# Patient Record
Sex: MALE | Race: WHITE | NOT HISPANIC OR LATINO | Employment: FULL TIME | URBAN - NONMETROPOLITAN AREA
[De-identification: names, ages, dates, MRNs, and addresses within clinical notes are randomized per-mention and may not be internally consistent; named-entity substitution may affect disease eponyms.]

---

## 2022-10-20 ENCOUNTER — OFFICE VISIT (OUTPATIENT)
Dept: INTERNAL MEDICINE | Facility: CLINIC | Age: 42
End: 2022-10-20

## 2022-10-20 VITALS
HEIGHT: 74 IN | WEIGHT: 234 LBS | OXYGEN SATURATION: 97 % | TEMPERATURE: 98.2 F | SYSTOLIC BLOOD PRESSURE: 148 MMHG | DIASTOLIC BLOOD PRESSURE: 110 MMHG | HEART RATE: 77 BPM | BODY MASS INDEX: 30.03 KG/M2

## 2022-10-20 DIAGNOSIS — E66.9 OBESITY (BMI 30-39.9): ICD-10-CM

## 2022-10-20 DIAGNOSIS — Z13.228 SCREENING FOR ENDOCRINE, METABOLIC AND IMMUNITY DISORDER: ICD-10-CM

## 2022-10-20 DIAGNOSIS — Z76.89 ENCOUNTER TO ESTABLISH CARE: ICD-10-CM

## 2022-10-20 DIAGNOSIS — R59.0 INGUINAL ADENOPATHY: ICD-10-CM

## 2022-10-20 DIAGNOSIS — Z13.220 SCREENING FOR CHOLESTEROL LEVEL: ICD-10-CM

## 2022-10-20 DIAGNOSIS — Z11.59 ENCOUNTER FOR HEPATITIS C SCREENING TEST FOR LOW RISK PATIENT: ICD-10-CM

## 2022-10-20 DIAGNOSIS — Z13.0 SCREENING FOR DEFICIENCY ANEMIA: ICD-10-CM

## 2022-10-20 DIAGNOSIS — Z13.29 SCREENING FOR ENDOCRINE, METABOLIC AND IMMUNITY DISORDER: ICD-10-CM

## 2022-10-20 DIAGNOSIS — Z00.00 ANNUAL PHYSICAL EXAM: Primary | ICD-10-CM

## 2022-10-20 DIAGNOSIS — Z13.1 SCREENING FOR DIABETES MELLITUS (DM): ICD-10-CM

## 2022-10-20 DIAGNOSIS — Z13.29 SCREENING FOR THYROID DISORDER: ICD-10-CM

## 2022-10-20 DIAGNOSIS — R53.83 OTHER FATIGUE: ICD-10-CM

## 2022-10-20 DIAGNOSIS — Z13.0 SCREENING FOR ENDOCRINE, METABOLIC AND IMMUNITY DISORDER: ICD-10-CM
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
## 2022-10-20 DIAGNOSIS — R03.0 ELEVATED BLOOD PRESSURE READING IN OFFICE WITHOUT DIAGNOSIS OF HYPERTENSION: ICD-10-CM

## 2022-10-20 PROCEDURE — 99386 PREV VISIT NEW AGE 40-64: CPT | Performed by: NURSE PRACTITIONER

## 2022-10-20 RX ORDER — DOXYCYCLINE HYCLATE 100 MG/1
100 CAPSULE ORAL 2 TIMES DAILY
Qty: 14 CAPSULE | Refills: 0 | Status: SHIPPED | OUTPATIENT
Start: 2022-10-20 | End: 2022-10-27

## 2022-10-20 NOTE — PROGRESS NOTES
Subjective   Rhett Sweeney is a 41 y.o. male and is here for a comprehensive physical exam and to establish care.   Acutely concerned about left groin lymph node.   Bit by tick around that area on left upper leg, chest, and back. Lymph node became palpable about 3 weeks ago and felt feverish.  Size of lymph node has decreased.  Been applying mupirocin to tick bite on left leg.   Denies fever, aches, chills. Admits to feeling overall tired and exhausted since.   No significant medical history.  BP is elevated - denies headache, visual changes, dizziness, lightheadedness, chest pain, SOA.     HPI:    Health Habits:  Eye exam within last 2 years? No   Dental exam every 6 months? No   Exercise habits: Working - no exercise outside of work-   Healthy diet? Typical american diet    The 10-year ASCVD risk score (Song BENJAMIN, et al., 2019) is: 1.1%    Values used to calculate the score:      Age: 41 years      Sex: Male      Is Non- : No      Diabetic: No      Tobacco smoker: No      Systolic Blood Pressure: 148 mmHg      Is BP treated: No      HDL Cholesterol: 52 mg/dL      Total Cholesterol: 177 mg/dL    Do you take any herbs or supplements that were not prescribed by a doctor? no  Are you taking aspirin daily? no     History:  Date last PSA: Never  He reports No decrease in urinary stream,  No nocturia, No dribbling, No hesitancy.    Family history of prostate cancer: No     The following portions of the patient's history were reviewed and updated as appropriate: He  has no past medical history on file.  He does not have a problem list on file.  He  has a past surgical history that includes Elbow surgery and Knee surgery.  His family history includes Arthritis in his maternal grandmother; Heart disease in his maternal grandfather.  He  reports that he has never smoked. He has never used smokeless tobacco. He reports current alcohol use. He reports that he does not use drugs.  Current Outpatient  "Medications   Medication Sig Dispense Refill   • doxycycline (VIBRAMYCIN) 100 MG capsule Take 1 capsule by mouth 2 (Two) Times a Day for 7 days. 14 capsule 0     No current facility-administered medications for this visit.       Review of Systems    Review of Systems   Constitutional: Positive for fatigue. Negative for chills and fever.   Respiratory: Negative.    Cardiovascular: Negative.    Gastrointestinal: Negative.    Musculoskeletal: Negative.    Skin: Positive for skin lesions.   Hematological: Positive for adenopathy.   All other systems reviewed and are negative.      Objective   BP (!) 148/110   Pulse 77   Temp 98.2 °F (36.8 °C) (Temporal)   Ht 188 cm (74\")   Wt 106 kg (234 lb)   SpO2 97%   BMI 30.04 kg/m²     Physical Exam  Constitutional:       General: He is not in acute distress.     Appearance: Normal appearance. He is obese. He is not diaphoretic.   HENT:      Head: Normocephalic and atraumatic.      Right Ear: Tympanic membrane, ear canal and external ear normal.      Left Ear: Tympanic membrane, ear canal and external ear normal.      Nose: Nose normal.      Mouth/Throat:      Mouth: Mucous membranes are moist.   Eyes:      Extraocular Movements: Extraocular movements intact.   Cardiovascular:      Rate and Rhythm: Normal rate and regular rhythm.   Pulmonary:      Effort: Pulmonary effort is normal.      Breath sounds: Normal breath sounds.   Musculoskeletal:         General: Normal range of motion.      Cervical back: Normal range of motion.   Lymphadenopathy:      Lower Body: Left inguinal adenopathy (nontender, mobile) present.   Skin:     Comments: Healed lesion to upper left thigh and back (where pt states he had tick bites), mild erythema, no drainage, firmness or warmth    Neurological:      General: No focal deficit present.      Mental Status: He is alert and oriented to person, place, and time.   Psychiatric:         Mood and Affect: Mood normal.         Behavior: Behavior normal. "         Health Maintenance   Topic Date Due   • INFLUENZA VACCINE  03/31/2023 (Originally 8/1/2022)   • TDAP/TD VACCINES (2 - Tdap) 08/09/2028        Assessment & Plan   Healthy male exam.     1.    Diagnosis Plan   1. Annual physical exam  CBC (No Diff)    Comprehensive Metabolic Panel    Lipid Panel    TSH Rfx On Abnormal To Free T4    Hemoglobin A1c    Hepatitis C Antibody      2. Encounter to establish care  CBC (No Diff)    Comprehensive Metabolic Panel    Lipid Panel    TSH Rfx On Abnormal To Free T4    Hemoglobin A1c    Hepatitis C Antibody      3. Elevated blood pressure reading in office without diagnosis of hypertension  Blood pressure is elevated today.  Recommend DASH diet, less than 1500 mg of sodium daily, 150 minutes of exercise per week, and home blood pressure monitoring.  Keep a log of your blood pressures and bring with you into the next appointment along with your home blood pressure machine.  When you check your blood pressure, make sure you have been sitting for 15 to 20 minutes, feet flat on the floor, and arm at the level of your heart.  Blood pressure goal is less than 130/80.  If blood pressure is over 180/110, that is dangerous and would require an ER visit.      4. Inguinal adenopathy; left  CBC (No Diff)    [facility] (IgG / M)    Lyme Disease Total Antibody With Reflex to Immunoassay    Ehrlichia Antibody Panel  Treat prophylactically with doxycycline due to multiple tick bites, adenopathy. Labs to rule out tickborne illnesses.       5. Tick bite of multiple sites  [facility] (IgG / M)    Lyme Disease Total Antibody With Reflex to Immunoassay    Ehrlichia Antibody Panel  Treat prophylactically with doxycycline due to multiple tick bites, adenopathy. Labs to rule out tickborne illnesses.       6. Other fatigue  Testosterone      7. Screening for thyroid disorder  TSH Rfx On Abnormal To Free T4      8. Screening for diabetes mellitus (DM)  Comprehensive Metabolic Panel     Hemoglobin A1c      9. Screening for deficiency anemia  CBC (No Diff)      10. Screening for cholesterol level  Lipid Panel      11. Screening for endocrine, metabolic and immunity disorder  Comprehensive Metabolic Panel      12. Encounter for hepatitis C screening test for low risk patient  Hepatitis C Antibody      13. Obesity (BMI 30-39.9)  BMI is >= 30 and <35. (Class 1 Obesity). The following options were offered after discussion;: exercise counseling/recommendations and nutrition counseling/recommendations          2. Patient Counseling:  -Health maintenance information provided and discussed  -Counseled on age-appropriate health screenings  -Immunizations for age discussed, encouraged.  -Encouraged 30 minutes of exercise 5 days a week, or 150 minutes total per week.  -Recommend healthy diet choices and portion control   -Discussed importance of regular dental visits and cleanings every 6 months.  -Discussed importance of regular eye exams    3. Discussed the patient's BMI with him.  The BMI is above average; BMI management plan is completed  4. Return in about 2 weeks (around 11/3/2022) for BP.         Prema Ramirez, APRN  10/20/2022  14:57 EDT

## 2022-10-21 ENCOUNTER — PATIENT MESSAGE (OUTPATIENT)
Dept: INTERNAL MEDICINE | Facility: CLINIC | Age: 42
End: 2022-10-21

## 2022-10-24 LAB
A PHAGOCYTOPH IGG TITR SER IF: NEGATIVE {TITER}
A PHAGOCYTOPH IGM TITR SER IF: NEGATIVE {TITER}
ALBUMIN SERPL-MCNC: 4.6 G/DL (ref 3.5–5.2)
ALBUMIN/GLOB SERPL: 1.6 G/DL
ALP SERPL-CCNC: 64 U/L (ref 39–117)
ALT SERPL-CCNC: 23 U/L (ref 1–41)
AST SERPL-CCNC: 21 U/L (ref 1–40)
B BURGDOR IGG+IGM SER QL IA: NEGATIVE
BILIRUB SERPL-MCNC: 0.5 MG/DL (ref 0–1.2)
BUN SERPL-MCNC: 8 MG/DL (ref 6–20)
BUN/CREAT SERPL: 10.4 (ref 7–25)
CALCIUM SERPL-MCNC: 9.6 MG/DL (ref 8.6–10.5)
CHLORIDE SERPL-SCNC: 104 MMOL/L (ref 98–107)
CHOLEST SERPL-MCNC: 177 MG/DL (ref 0–200)
CO2 SERPL-SCNC: 26 MMOL/L (ref 22–29)
CREAT SERPL-MCNC: 0.77 MG/DL (ref 0.76–1.27)
E CHAFFEENSIS IGG TITR SER IF: NEGATIVE {TITER}
E CHAFFEENSIS IGM TITR SER IF: NEGATIVE {TITER}
EGFRCR SERPLBLD CKD-EPI 2021: 116.1 ML/MIN/1.73
ERYTHROCYTE [DISTWIDTH] IN BLOOD BY AUTOMATED COUNT: 12 % (ref 12.3–15.4)
GLOBULIN SER CALC-MCNC: 2.9 GM/DL
GLUCOSE SERPL-MCNC: 79 MG/DL (ref 65–99)
HBA1C MFR BLD: 5.1 % (ref 4.8–5.6)
HCT VFR BLD AUTO: 43.2 % (ref 37.5–51)
HCV AB S/CO SERPL IA: <0.1 S/CO RATIO (ref 0–0.9)
HDLC SERPL-MCNC: 52 MG/DL (ref 40–60)
HGB BLD-MCNC: 15.5 G/DL (ref 13–17.7)
LDLC SERPL CALC-MCNC: 109 MG/DL (ref 0–100)
MCH RBC QN AUTO: 32.2 PG (ref 26.6–33)
MCHC RBC AUTO-ENTMCNC: 35.9 G/DL (ref 31.5–35.7)
MCV RBC AUTO: 89.6 FL (ref 79–97)
PLATELET # BLD AUTO: 271 10*3/MM3 (ref 140–450)
POTASSIUM SERPL-SCNC: 4.4 MMOL/L (ref 3.5–5.2)
PROT SERPL-MCNC: 7.5 G/DL (ref 6–8.5)
R RICKETTSI IGG SER QL IA: POSITIVE
R RICKETTSI IGG TITR SER IF: ABNORMAL {TITER}
R RICKETTSI IGM SER-ACNC: 0.54 INDEX (ref 0–0.89)
RBC # BLD AUTO: 4.82 10*6/MM3 (ref 4.14–5.8)
SODIUM SERPL-SCNC: 141 MMOL/L (ref 136–145)
TESTOST SERPL-MCNC: 493 NG/DL (ref 264–916)
TRIGL SERPL-MCNC: 87 MG/DL (ref 0–150)
TSH SERPL DL<=0.005 MIU/L-ACNC: 2.07 UIU/ML (ref 0.27–4.2)
VLDLC SERPL CALC-MCNC: 16 MG/DL (ref 5–40)
WBC # BLD AUTO: 7.28 10*3/MM3 (ref 3.4–10.8)

## 2022-10-24 NOTE — PROGRESS NOTES
Notify pt of normal results  - LDL slightly elevated at 109  -Pending RMSF labs, other tick panels negative

## 2022-10-25 NOTE — PROGRESS NOTES
Call and notify pt he did test positive for [place] mountain spotted fever which is a tick borne illness. The antibiotic I sent for him will treat this condition. This is likely why he had the lymph node swollen around the bite sight and feeling fatigued  Complete the entire course of antibiotics without missing dose.   Also have patient keep a log of his Bps and schedule appointment to come in to recheck asap as it was elevated significantly in office.

## 2022-11-07 ENCOUNTER — OFFICE VISIT (OUTPATIENT)
Dept: INTERNAL MEDICINE | Facility: CLINIC | Age: 42
End: 2022-11-07

## 2022-11-07 VITALS
TEMPERATURE: 97.8 F | BODY MASS INDEX: 30.29 KG/M2 | DIASTOLIC BLOOD PRESSURE: 90 MMHG | HEART RATE: 61 BPM | WEIGHT: 236 LBS | HEIGHT: 74 IN | SYSTOLIC BLOOD PRESSURE: 140 MMHG | OXYGEN SATURATION: 98 %

## 2022-11-07 DIAGNOSIS — I10 PRIMARY HYPERTENSION: Primary | ICD-10-CM

## 2022-11-07 PROCEDURE — 99213 OFFICE O/P EST LOW 20 MIN: CPT | Performed by: NURSE PRACTITIONER

## 2022-11-07 NOTE — PROGRESS NOTES
"  Office Visit      Patient Name: Rhett Sweeney  : 1980   MRN: [id number]   Care Team: Patient Care Team:  Prema Ramirez APRN as PCP - General (Family Medicine)    Chief Complaint  Hypertension (F/up)    Subjective     Subjective      Rhett Sweeney presents to [facility] for follow-up of elevated blood pressure reading on initial visit.  He was suppose to have purchased BP machine and monitored, he has not done so.   Initial blood pressure 142/90, repeat 140/90.  Discussed with patient he has had 2 elevated readings in office and this is eligible for for diagnosis of hypertension.  Patient declines medication at this time.  States that his diet has been very bad lately, he has been eating a lot of fast food and high salt foods.  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  He denies chest pain, shortness of breath, headache, dizziness, and excess fatigue.   He is actually feeling much better after treatment for [place] spotted fever with doxycycline.    Review of Systems-see HPI    Objective     Objective   Vital Signs:   /90   Pulse 61   Temp 97.8 °F (36.6 °C) (Temporal)   Ht 188 cm (74\")   Wt 107 kg (236 lb)   SpO2 98%   BMI 30.30 kg/m²     Physical Exam  Constitutional:       General: He is not in acute distress.     Appearance: Normal appearance. He is not diaphoretic.   HENT:      Right Ear: External ear normal.      Left Ear: External ear normal.      Nose: Nose normal.      Mouth/Throat:      Mouth: Mucous membranes are moist.      Pharynx: Oropharynx is clear.   Eyes:      Extraocular Movements: Extraocular movements intact.   Cardiovascular:      Rate and Rhythm: Normal rate and regular rhythm.   Pulmonary:      Effort: Pulmonary effort is normal.      Breath sounds: Normal breath sounds.   Musculoskeletal:         General: Normal range of motion.      Cervical back: Normal range of motion.      Right lower leg: No edema.      Left lower leg: No edema. "   Neurological:      Mental Status: He is alert and oriented to person, place, and time.   Psychiatric:         Mood and Affect: Mood normal.         Behavior: Behavior normal.          Assessment / Plan      Assessment & Plan   Problem List Items Addressed This Visit    None  Visit Diagnoses     Primary hypertension    -  Primary        BP remains elevated. Discussed initiating medication, patient declines. Recommend DASH diet, less than 1500 mg of sodium daily, 150 minutes of exercise per week, and home blood pressure monitoring.  Keep a log of your blood pressures. When you check your blood pressure, make sure you have been sitting for 15 to 20 minutes, feet flat on the floor, and arm at the level of your heart.  Blood pressure goal is less than 130/80. Patient declines treatment at this time and wants to work on DASH diet and reevaluate at next appointment in January as scheduled. Notify me sooner if BP remains elevated/worsens To ER if BP is ever 180/110, chest pain, severe headache, SOA.     Follow Up   Return for Next scheduled follow up.  Patient was given instructions and counseling regarding his condition or for health maintenance advice. Please see specific information pulled into the AVS if appropriate.     ALISON Doyle  [facility] - [place]

## 2023-04-06 ENCOUNTER — OFFICE VISIT (OUTPATIENT)
Dept: INTERNAL MEDICINE | Facility: CLINIC | Age: 43
End: 2023-04-06
Payer: COMMERCIAL

## 2023-04-06 VITALS
WEIGHT: 234.6 LBS | TEMPERATURE: 97.6 F | HEART RATE: 65 BPM | SYSTOLIC BLOOD PRESSURE: 112 MMHG | HEIGHT: 74 IN | OXYGEN SATURATION: 98 % | DIASTOLIC BLOOD PRESSURE: 78 MMHG | BODY MASS INDEX: 30.11 KG/M2
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
## 2023-04-06 DIAGNOSIS — I10 PRIMARY HYPERTENSION: ICD-10-CM

## 2023-04-06 RX ORDER — DOXYCYCLINE HYCLATE 100 MG/1
100 CAPSULE ORAL 2 TIMES DAILY
Qty: 20 CAPSULE | Refills: 0 | Status: SHIPPED | OUTPATIENT
Start: 2023-04-06 | End: 2023-04-16

## 2023-04-06 NOTE — PROGRESS NOTES
"Chief Complaint   Patient presents with   • Insect Bite     Tick bite, lower R abdominal/upper pelvic area     Subjective   Rhett Sweeney is a 42 y.o. male.     History of Present Illness     Patient presents with a tick bite.  Onset yesterday.  Location midline, upper perineum area.  Only a mild red spot.  Patient removed the tick, it was attached, unknown how long.  He works outdoors.  No fever, myalgias, chills.  He was previously diagnosed with [place] spotted fever back in October of last year, took a round of doxycycline and felt immediately better after the first 1-2 doses.  No other complaints or concerns today.  Blood pressure is much improved compared to previous.  Patient states he is less stressed and he feels like that was contributing.  He states he has not made any diet or exercise changes.      The following portions of the patient's history were reviewed and updated as appropriate: allergies, current medications, past family history, past medical history, past social history, past surgical history and problem list.    Objective   /78 (BP Location: Left arm, Patient Position: Sitting, Cuff Size: Adult)   Pulse 65   Temp 97.6 °F (36.4 °C) (Temporal)   Ht 188 cm (74\")   Wt 106 kg (234 lb 9.6 oz)   SpO2 98%   BMI 30.12 kg/m²   Body mass index is 30.12 kg/m².  Physical Exam  Vitals and nursing note reviewed. Exam conducted with a chaperone present (Stacy Valadez LPN).   Constitutional:       General: He is not in acute distress.     Appearance: Normal appearance. He is obese. He is not diaphoretic.   HENT:      Head: Normocephalic and atraumatic.      Right Ear: External ear normal.      Left Ear: External ear normal.      Nose: Nose normal.   Eyes:      General: No scleral icterus.     Extraocular Movements: Extraocular movements intact.   Neck:      Trachea: Trachea and phonation normal.   Cardiovascular:      Rate and Rhythm: Normal rate and regular rhythm.   Pulmonary:      Effort: " Pulmonary effort is normal.   Abdominal:      Tenderness: There is no abdominal tenderness.   Musculoskeletal:         General: Normal range of motion.      Cervical back: Normal range of motion.   Skin:     General: Skin is warm and dry.      Comments: Location: Midline upper perineum area, less than a millimeter annular erythema where tick was removed, not raised, no drainage or streaking or warmth   Neurological:      Mental Status: He is alert and oriented to person, place, and time.   Psychiatric:         Mood and Affect: Mood normal.         Behavior: Behavior normal.         Thought Content: Thought content normal.         Judgment: Judgment normal.         Assessment & Plan   Rhett Sweeney is here today and the following problems have been addressed:      Diagnoses and all orders for this visit:    1. Tick bite, initial encounter (Primary)  -     doxycycline (VIBRAMYCIN) 100 MG capsule; Take 1 capsule by mouth 2 (Two) Times a Day for 10 days.  Dispense: 20 capsule; Refill: 0    2. Primary hypertension    42-year-old male presents with tick bite.  No current signs of infection.  History of [place] spotted fever.  We will treat with doxycycline prophylactically, take as prescribed.  Notify if any rash, fever, myalgia.  Patient asks about allergy to meat, discussed that this can be another condition that ticks carry and if he develops any intolerance to meat to let me know and we can do labs, but not a common condition.  Patient was previously diagnosed with hypertension due to having 2 elevated readings in office.  His blood pressure today is much controlled.  Patient states he was having a lot of stress during those previous appointments which is much more controlled now which could have been contributing.  Continue blood pressure monitoring, low-sodium diet, exercise regimen 150 minutes a week.    Follow Up   Return in 6 months (on 10/20/2023) for Annual.  Patient was given instructions and counseling  regarding his condition or for health maintenance advice. Please see specific information pulled into the AVS if appropriate.     Prema ROSAS  [facility] - [place]

## 2025-05-07 ENCOUNTER — OFFICE VISIT (OUTPATIENT)
Dept: INTERNAL MEDICINE | Facility: CLINIC | Age: 45
End: 2025-05-07
Payer: COMMERCIAL

## 2025-05-07 VITALS
BODY MASS INDEX: 31.97 KG/M2 | TEMPERATURE: 98 F | DIASTOLIC BLOOD PRESSURE: 76 MMHG | HEIGHT: 72 IN | SYSTOLIC BLOOD PRESSURE: 118 MMHG | OXYGEN SATURATION: 98 % | WEIGHT: 236 LBS | HEART RATE: 78 BPM

## 2025-05-07 DIAGNOSIS — S20.96XA TICK BITE OF THORACIC WALL, UNSPECIFIED WHETHER FRONT OR BACK, INITIAL ENCOUNTER: Primary | ICD-10-CM
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
## 2025-05-07 RX ORDER — DOXYCYCLINE 100 MG/1
100 CAPSULE ORAL 2 TIMES DAILY
Qty: 20 CAPSULE | Refills: 0 | Status: SHIPPED | OUTPATIENT
Start: 2025-05-07

## 2025-05-07 NOTE — PROGRESS NOTES
"Rhett Sweeney is a 44 y.o. male.    Chief Complaint   Patient presents with    Insect Bite     Red, itchy. Found it the night before last.   Right side of ribs       HPI   Rhett presents today complaining of a tick bite to his right side.  He noticed it a day ago.  He did remove the tick from his body and believes he did remove the head of the tick as well.  He does admit to a headache that developed last night.  Denies any fever, body aches, or additional symptoms.  He does report slight itching to the site of the bite.    The following portions of the patient's history were reviewed and updated as appropriate: allergies, current medications, past family history, past medical history, past social history, past surgical history and problem list.     No Known Allergies      Current Outpatient Medications:     doxycycline (VIBRAMYCIN) 100 MG capsule, Take 1 capsule by mouth 2 (Two) Times a Day., Disp: 20 capsule, Rfl: 0    ROS    Review of Systems   Skin:  Positive for skin lesions.       Vitals:    05/07/25 0955   BP: 118/76   Pulse: 78   Temp: 98 °F (36.7 °C)   SpO2: 98%   Weight: 107 kg (236 lb)   Height: 182.9 cm (72\")   PainSc: 0-No pain         Physical Exam     Physical Exam  Constitutional:       General: He is not in acute distress.     Appearance: Normal appearance. He is well-developed.   HENT:      Head: Normocephalic and atraumatic.      Right Ear: External ear normal.      Left Ear: External ear normal.   Eyes:      Extraocular Movements: Extraocular movements intact.      Conjunctiva/sclera: Conjunctivae normal.   Cardiovascular:      Rate and Rhythm: Normal rate.   Pulmonary:      Effort: Pulmonary effort is normal. No respiratory distress.   Abdominal:      General: There is no distension.   Skin:     General: Skin is warm and dry.      Findings: Lesion (Small, red bite destin to the right side of the mid axillary line) present.   Neurological:      Mental Status: He is alert and oriented to person, " place, and time.      Cranial Nerves: No cranial nerve deficit.   Psychiatric:         Mood and Affect: Mood normal.         Behavior: Behavior normal.         Assessment/Plan    Diagnoses and all orders for this visit:    1. Tick bite of thoracic wall, unspecified whether front or back, initial encounter (Primary)    Other orders  -     doxycycline (VIBRAMYCIN) 100 MG capsule; Take 1 capsule by mouth 2 (Two) Times a Day.  Dispense: 20 capsule; Refill: 0      Will treat with 10 days of doxycycline.  Discussed potential side effects associated with the antibiotic.    New Medications Ordered This Visit   Medications    doxycycline (VIBRAMYCIN) 100 MG capsule     Sig: Take 1 capsule by mouth 2 (Two) Times a Day.     Dispense:  20 capsule     Refill:  0       No orders of the defined types were placed in this encounter.      No follow-ups on file.    Melina Mccain DO

## 2025-07-29 ENCOUNTER — PATIENT ROUNDING (BHMG ONLY) (OUTPATIENT)
Dept: URGENT CARE | Facility: CLINIC | Age: 45
End: 2025-07-29
Payer: COMMERCIAL

## 2025-07-29 ENCOUNTER — OFFICE VISIT (OUTPATIENT)
Dept: INTERNAL MEDICINE | Facility: CLINIC | Age: 45
End: 2025-07-29
Payer: COMMERCIAL

## 2025-07-29 VITALS
DIASTOLIC BLOOD PRESSURE: 94 MMHG | HEIGHT: 72 IN | BODY MASS INDEX: 32.37 KG/M2 | OXYGEN SATURATION: 96 % | WEIGHT: 239 LBS | TEMPERATURE: 97.8 F | RESPIRATION RATE: 17 BRPM | SYSTOLIC BLOOD PRESSURE: 140 MMHG | HEART RATE: 62 BPM

## 2025-07-29 DIAGNOSIS — M25.511 ACUTE PAIN OF RIGHT SHOULDER: ICD-10-CM

## 2025-07-29 DIAGNOSIS — M54.2 NECK PAIN: Primary | ICD-10-CM

## 2025-07-29 DIAGNOSIS — M24.80 CREPITUS OF CERVICAL SPINE: ICD-10-CM

## 2025-07-29 PROCEDURE — 99213 OFFICE O/P EST LOW 20 MIN: CPT | Performed by: PHYSICIAN ASSISTANT

## 2025-07-29 NOTE — PROGRESS NOTES
"     Office Visit      Patient Name: Rhett Sweeney  : 1980   MRN: [id number]     Chief Complaint:    Chief Complaint   Patient presents with    Shoulder Pain     Rt shoulder, neck pain x 1 month, hit head when working on a house a month ago       History of Present Illness: Rhett Sweeney is a 44 y.o. male who is here today to discuss one month of intermittent right shoulder pain and right hand numbness. He hit his head in an attic which knocked his head backward which precipitated symptoms, no loss of consciousness.  He has noticed cervical spine crepitus intermittently since hitting his head and he has noticed that shoulder pain and right hand numbness are worse when neck is cracking.  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  Hand numbness occurs when shoulder pain is worse, affecting the right thumb, 2nd and 3rd fingers only.  The shoulder tends to hurt when he elevates his arm to shoulder level but then gets better again once he elevates it beyond shoulder level.  Yesterday he had significant crepitus and very bothersome shoulder pain and hand numbness so he avoided physical activity most of the day and today symptoms are much worse.  Typically symptoms are worse when sleeping and first thing in the morning and then improves throughout the day.    No dizziness or confusion.      Subjective      I have reviewed and the following portions of the patient's history were updated as appropriate: past family history, past medical history, past social history, past surgical history and problem list.    No current outpatient medications on file.    No Known Allergies    Objective     Vital Signs:   Vitals:    25 0858   BP: 140/94   Pulse: 62   Resp: 17   Temp: 97.8 °F (36.6 °C)   TempSrc: Temporal   SpO2: 96%   Weight: 108 kg (239 lb)   Height: 182.9 cm (72\")     Body " mass index is 32.41 kg/m².    Physical Exam  Vitals and nursing note reviewed.   Constitutional:       General: He is not in acute distress.     Appearance: He is well-developed. He is not ill-appearing, toxic-appearing or diaphoretic.   HENT:      Head: Normocephalic and atraumatic.      Right Ear: External ear normal.      Left Ear: External ear normal.   Eyes:      General: No scleral icterus.     Extraocular Movements: Extraocular movements intact.      Conjunctiva/sclera: Conjunctivae normal.      Pupils: Pupils are equal, round, and reactive to light.   Cardiovascular:      Rate and Rhythm: Normal rate and regular rhythm.      Heart sounds: Normal heart sounds. No murmur heard.     No friction rub. No gallop.   Pulmonary:      Effort: Pulmonary effort is normal. No respiratory distress.      Breath sounds: Normal breath sounds. No stridor. No wheezing, rhonchi or rales.   Chest:      Chest wall: No tenderness.   Musculoskeletal:         General: No deformity. Normal range of motion.      Right shoulder: Tenderness present. No swelling, deformity, effusion, laceration, bony tenderness or crepitus. Normal range of motion (None on exam today). Normal strength. Normal pulse.        Arms:       Cervical back: Normal range of motion and neck supple. Crepitus present. No swelling, edema, deformity, erythema, signs of trauma, lacerations, rigidity, spasms, tenderness (No paraspinal tenderness) or bony tenderness. No pain with movement. Normal range of motion.        Back:       Right lower leg: No edema.      Left lower leg: No edema.      Comments: Negative Spurling sign bilaterally.   Skin:     General: Skin is warm and dry.      Capillary Refill: Capillary refill takes less than 2 seconds.      Findings: No rash.   Neurological:      General: No focal deficit present.      Mental Status: He is alert and oriented to person, place, and time. Mental status is at baseline.      Cranial Nerves: No cranial nerve deficit.       Sensory: No sensory deficit.      Motor: No weakness or abnormal muscle tone.      Coordination: Coordination normal.      Gait: Gait normal.      Deep Tendon Reflexes: Reflexes normal.   Psychiatric:         Mood and Affect: Mood normal.         Behavior: Behavior normal.         Thought Content: Thought content normal.         Judgment: Judgment normal.               Assessment / Plan      Assessment/Plan:   Diagnoses and all orders for this visit:    1. Neck pain (Primary)  -     XR Spine Cervical Complete 4 or 5 View    2. Crepitus of cervical spine  -     XR Spine Cervical Complete 4 or 5 View    3. Acute pain of right shoulder  -     XR Spine Cervical Complete 4 or 5 View       Not interested in seeing orthopedic surgeon, only wants to make sure neck is ok.        Follow Up:   Return if symptoms worsen or fail to improve.    Patient was given instructions and counseling regarding his condition or for health maintenance advice. Please see specific information pulled into the AVS if appropriate.     Alva Zelaya PA-C  [facility] [address] [place]     Please note that portions of this note may have been completed with a voice recognition program.